# Patient Record
Sex: FEMALE | Race: WHITE | NOT HISPANIC OR LATINO | Employment: UNEMPLOYED | URBAN - METROPOLITAN AREA
[De-identification: names, ages, dates, MRNs, and addresses within clinical notes are randomized per-mention and may not be internally consistent; named-entity substitution may affect disease eponyms.]

---

## 2017-07-20 ENCOUNTER — OFFICE VISIT (OUTPATIENT)
Dept: GASTROENTEROLOGY | Facility: CLINIC | Age: 72
End: 2017-07-20
Payer: MEDICARE

## 2017-07-20 VITALS
WEIGHT: 244.94 LBS | BODY MASS INDEX: 39.36 KG/M2 | SYSTOLIC BLOOD PRESSURE: 112 MMHG | DIASTOLIC BLOOD PRESSURE: 80 MMHG | HEIGHT: 66 IN

## 2017-07-20 DIAGNOSIS — K22.719 BARRETT'S ESOPHAGUS WITH DYSPLASIA: Primary | ICD-10-CM

## 2017-07-20 PROCEDURE — 99999 PR PBB SHADOW E&M-EST. PATIENT-LVL II: CPT | Mod: PBBFAC,,, | Performed by: INTERNAL MEDICINE

## 2017-07-20 PROCEDURE — 99212 OFFICE O/P EST SF 10 MIN: CPT | Mod: PBBFAC,PN | Performed by: INTERNAL MEDICINE

## 2017-07-20 PROCEDURE — 1126F AMNT PAIN NOTED NONE PRSNT: CPT | Mod: ,,, | Performed by: INTERNAL MEDICINE

## 2017-07-20 PROCEDURE — 1159F MED LIST DOCD IN RCRD: CPT | Mod: ,,, | Performed by: INTERNAL MEDICINE

## 2017-07-20 PROCEDURE — 99213 OFFICE O/P EST LOW 20 MIN: CPT | Mod: S$PBB,,, | Performed by: INTERNAL MEDICINE

## 2017-07-20 RX ORDER — GABAPENTIN 100 MG/1
CAPSULE ORAL
COMMUNITY
Start: 2017-06-15

## 2017-07-20 RX ORDER — FLUTICASONE PROPIONATE AND SALMETEROL 50; 250 UG/1; UG/1
POWDER RESPIRATORY (INHALATION)
COMMUNITY
Start: 2017-06-07 | End: 2017-07-20 | Stop reason: SDUPTHER

## 2017-07-20 RX ORDER — POTASSIUM CHLORIDE 750 MG/1
TABLET, EXTENDED RELEASE ORAL
COMMUNITY
Start: 2017-06-15

## 2017-08-30 ENCOUNTER — TELEPHONE (OUTPATIENT)
Dept: GASTROENTEROLOGY | Facility: CLINIC | Age: 72
End: 2017-08-30

## 2017-08-30 NOTE — TELEPHONE ENCOUNTER
----- Message from Tran Caldwell sent at 8/30/2017  9:18 AM CDT -----  No. [phone]   Will patient be notified of the new physician.  Please call.

## 2021-01-04 ENCOUNTER — HISTORICAL (OUTPATIENT)
Dept: ADMINISTRATIVE | Facility: HOSPITAL | Age: 76
End: 2021-01-04

## 2022-04-11 ENCOUNTER — HISTORICAL (OUTPATIENT)
Dept: ADMINISTRATIVE | Facility: HOSPITAL | Age: 77
End: 2022-04-11
Payer: MEDICARE

## 2022-04-25 VITALS
SYSTOLIC BLOOD PRESSURE: 153 MMHG | WEIGHT: 243 LBS | BODY MASS INDEX: 39.05 KG/M2 | HEIGHT: 66 IN | DIASTOLIC BLOOD PRESSURE: 77 MMHG

## 2022-05-05 NOTE — HISTORICAL [facility] CERNER
This is a historical note converted from [employer]. Formatting and pictures may have been removed.  Please reference Abelardo for original formatting and attached multimedia. Chief Complaint  Pt is here for bilateral knee pain . Pt stated the right knee is worse. She stated the pain has been going on for over a year but has progressed over the past few months.  History of Present Illness  Patient has?occasional pain in both knees. ?The right is a little worse than the left. ?Both have been improving?over the past several months.? No history of injury.? No swelling. ?No locking.  Review of Systems  Systemic: No fever, no chills, and no recent weight change.  Head: No headache - frequent.  Eyes: No vision problems.  Otolarnygeal: No hearing loss, no earache, no epistaxis, no hoarseness, and no tooth pain. Gums normal.  Cardiovascular: No chest pain or discomfort and no palpitations.  Pulmonary: No pulmonary symptoms - difficulty sleeping, no dyspnea, and cough not worse in the morning.  Gastrointestinal: Appetite not decreased. No dysphagia and no constant eructation. No nausea, no vomiting, no abdominal pain, no hematochezia, and no loose/mushy stools - frequent. No constipation - frequent.  Genitourinary: No genitourinary symptoms - Getting up every night to urinate and no increase in urinary frequency. No urinary hesitancy. No urinary loss of control - difficulty stopping urination and no burning sensation during urination.  Musculoskeletal: No calf muscle cramps and no localized soft tissue swelling of the ankle.  Neurological: No fainting and no convulsions.  Psychological: Not feeling nervous tension, not feeling nervous from exhaustion, and no depression.  Skin: No rash. Previous history of no ulcers.  Physical Exam  Vitals & Measurements  T:?36.5? ?C (Oral)? HR:?78(Peripheral)? BP:?153/77?  HT:?168.00?cm? WT:?110.220?kg? BMI:?39.05?  Bilateral knee exam:  2+ pulses  Normal sensation  Normal motor function  No  atrophy  Range of motion 0 degrees to 135 degrees bilaterally  No pathologic laxity of the cruciate or collateral ligaments  Mild?medial patellofemoral tenderness to palpation  No crepitus  No medial or lateral joint line tenderness  No swelling, no redness, no increased heat  Normal gait  Normal hip range of motion symmetrically without tenderness in both knees  Radiographs of?both the right and left knees show?mild?degenerative changes only. ?Essentially normal cartilage spaces.  Assessment/Plan  1.?Osteoarthritis of knees, bilateral?M17.0  ?Weight loss  Home exercise program  Follow-up as needed  Ordered:  Clinic Follow-up PRN, 01/04/21 9:59:00 CST, Future Order, Orthopaedics  Office/Outpatient Visit Level 2 New [address]84 PC, Osteoarthritis of knees, bilateral  Obesity, [facility], 01/04/21 9:59:00 CST  ?  2.?Obesity?E66.9  Ordered:  Clinic Follow-up PRN, 01/04/21 9:59:00 CST, Future Order, Orthopaedics  Office/Outpatient Visit Level 2 New [id number] PC, Osteoarthritis of knees, bilateral  Obesity, [facility], 01/04/21 9:59:00 CST  ?  Orders:  XR Knee Left 4 or More Views, Routine, 01/04/21 9:30:00 CST, None, Ambulatory, Rad Type, Bilateral knee pain, Not Scheduled, 01/04/21 9:30:00 CST  XR Knee Right 4 or More Views, Routine, 01/04/21 9:31:00 CST, None, Ambulatory, Rad Type, Bilateral knee pain, Not Scheduled, 01/04/21 9:31:00 CST  Referrals  Clinic Follow-up PRN, 01/04/21 9:59:00 CST, [facility]   Problem List/Past Medical History  Ongoing  Asthma  Hyperlipidemia  Hypertension  Obesity  Osteoarthritis of knees, bilateral  Historical  No qualifying data  Procedure/Surgical History  Vaginal hysterectomy, for uterus 250 g or less; (1976)  Appendectomy;  Esophageal motility  Thyroidectomy, total or complete  Tonsillectomy and adenoidectomy; age 13 or over   Medications  acyclovir 400 mg oral tablet, 400 mg= 1 tab(s), Oral, TID  Advair Diskus 250 mcg-50 mcg inhalation powder,  INH, BID  atorvastatin 10 mg oral tablet, 10 mg= 1 tab(s), Oral, Daily  cetirizine 10 mg oral tablet, 10 mg= 1 tab(s), Oral, Daily  escitalopram 20 mg oral tablet, 20 mg= 1 tab(s), Oral, Daily  fluticasone 50 mcg/inh nasal spray, 1 spray(s), Both Nostrils, BID  gabapentin 300 mg oral capsule, 300 mg= 1 cap(s), Oral, BID  hydrochlorothiazide-losartan 25 mg-100 mg oral tablet, 1 tab(s), Oral, Daily  levothyroxine 75 mcg (0.075 mg) oral tablet, 75 mcg= 1 tab(s), Oral, Daily  montelukast 10 mg oral TABLET, 10 mg= 1 tab(s), Oral, Daily  Pantoprazole 40 mg ORAL EC-Tablet, 40 mg= 1 tab(s), Oral, Daily  zolpidem 10 mg oral tablet, 10 mg= 1 tab(s), Oral, qPM  Allergies  No Known Allergies  Social History  Abuse/Neglect  No, 01/04/2021  Tobacco  Former smoker, quit more than 30 days ago, No, 01/04/2021  Health Maintenance  Health Maintenance  ???Pending?(in the next year)  ??? ??OverDue  ??? ? ? ?Advance Directive due??01/02/21??and every 1??year(s)  ??? ? ? ?Cognitive Screening due??01/02/21??and every 1??year(s)  ??? ??Due?  ??? ? ? ?Alcohol Misuse Screening due??01/02/21??and every 1??year(s)  ??? ? ? ?ADL Screening due??01/04/21??and every 1??year(s)  ??? ? ? ?Aspirin Therapy for CVD Prevention due??01/04/21??and every 1??year(s)  ??? ? ? ?Asthma Management-Asthma Education due??01/04/21??and every 6??month(s)  ??? ? ? ?Asthma Management-Spirometry due??01/04/21??Variable frequency  ??? ? ? ?Asthma Management-[place] Flow due??01/04/21??Variable frequency  ??? ? ? ?Asthma Management-Written Action Plan due??01/04/21??and every 6??month(s)  ??? ? ? ?Bone Density Screening due??01/04/21??Variable frequency  ??? ? ? ?Hypertension Maintenance-Medication Prescribed due??01/04/21??and every 1??year(s)  ??? ? ? ?Hypertension Management-Education due??01/04/21??and every 1??year(s)  ??? ? ? ?Medicare Annual Wellness Exam due??01/04/21??and every 1??year(s)  ??? ? ? ?Tetanus Vaccine due??01/04/21??and every 10??year(s)  ???  ??Due In Future?  ??? ? ? ?Obesity Screening not due until??01/01/22??and every 1??year(s)  ??? ? ? ?Fall Risk Assessment not due until??01/02/22??and every 1??year(s)  ??? ? ? ?Functional Assessment not due until??01/02/22??and every 1??year(s)  ???Satisfied?(in the past 1 year)  ??? ??Satisfied?  ??? ? ? ?Blood Pressure Screening on??01/04/21.??Satisfied by Sera Venegas LPN  ??? ? ? ?Body Mass Index Check on??01/04/21.??Satisfied by Sera Venegas LPN  ??? ? ? ?Fall Risk Assessment on??01/04/21.??Satisfied by Sera Venegas LPN  ??? ? ? ?Functional Assessment on??01/04/21.??Satisfied by Sera Venegas LPN  ??? ? ? ?Hypertension Management-Blood Pressure on??01/04/21.??Satisfied by Sera Venegas LPN  ??? ? ? ?Obesity Screening on??01/04/21.??Satisfied by Sera Venegas LPN  ?

## 2023-04-03 NOTE — PROGRESS NOTES
4/3/23 3:26 PM     Remote Patient Order Discontinued    Received request from Lilly Yoder RN  to discontinue order for remote patient monitoring of CHF, COPD, and HTN and order completed.      Hilda Mccormick DNP, FNP-C, Remote Patient Monitoring NP, (ph) [phone] Subjective:       Patient ID: Delilah Joshi is a 72 y.o. female.    Chief Complaint: Follow-up    HPI   GERD/ Sharma's with EMR / ablation  Patient complains of dyspepsia. Symptoms have been present for approximately a few months. Symptoms include belching. The patient denies abdominal bloating. Symptoms appear to be worsened by carbonated beverages. Risk factors present for GERD include obesity. Risk factors absent for GERD are previous diagnosis of hiatal hernia. Studies performed so far include upper GI, result: negative. Treatments tried so far include lifestyle change including elevate HOB. Results of treatment: considerable improvement in symptom severity. Currently, the symptoms are mild and occur approximately 1 times per month.    Cough, hoarseness , on PPI   Review of Systems   Constitutional: Negative.  Negative for appetite change.   HENT: Negative for congestion, trouble swallowing and voice change.    Respiratory: Negative for apnea, wheezing and stridor.    Cardiovascular: Negative for chest pain.   Gastrointestinal: Negative for abdominal distention, abdominal pain, blood in stool, rectal pain and vomiting.   Genitourinary: Negative for dysuria.   Musculoskeletal: Negative for arthralgias and back pain.   Skin: Negative for pallor and rash.   Neurological: Negative for tremors and weakness.   Psychiatric/Behavioral: Negative for confusion. The patient is not nervous/anxious.    All other systems reviewed and are negative.          Objective:      Physical Exam   Constitutional: She is oriented to person, place, and time. She appears well-developed and well-nourished.   HENT:   Head: Normocephalic and atraumatic.   Eyes: EOM are normal. Pupils are equal, round, and reactive to light. Left eye exhibits no discharge. No scleral icterus.   Neck: No JVD present. No tracheal deviation present.   Cardiovascular: Normal rate and regular rhythm.  Exam reveals no friction rub.    No murmur  heard.  Pulmonary/Chest: Effort normal.   Abdominal: Soft. Bowel sounds are normal. She exhibits no mass. There is no guarding.   Musculoskeletal: Normal range of motion. She exhibits no edema, tenderness or deformity.   Neurological: She is alert and oriented to person, place, and time. No cranial nerve deficit. Coordination normal.   Skin: No rash noted. No pallor.   Psychiatric: Her behavior is normal. Thought content normal.   Vitals reviewed.          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